# Patient Record
Sex: FEMALE | Race: WHITE | Employment: UNEMPLOYED | URBAN - METROPOLITAN AREA
[De-identification: names, ages, dates, MRNs, and addresses within clinical notes are randomized per-mention and may not be internally consistent; named-entity substitution may affect disease eponyms.]

---
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
## 2019-01-02 NOTE — PROGRESS NOTES
Patient cholesterol levels are high, likely as a medication induced side effect of fluphenazine, we could of course try a different medication to try to manage patient psychosis, but most antipsychotics have a high likelihood of causing these same side eff

## 2019-07-29 ENCOUNTER — LAB ENCOUNTER (OUTPATIENT)
Dept: LAB | Age: 19
End: 2019-07-29
Attending: NURSE PRACTITIONER
Payer: COMMERCIAL

## 2019-07-29 DIAGNOSIS — Z51.81 MEDICATION MONITORING ENCOUNTER: ICD-10-CM

## 2019-07-29 LAB
ALBUMIN SERPL-MCNC: 3.7 G/DL (ref 3.4–5)
ALBUMIN/GLOB SERPL: 0.9 {RATIO} (ref 1–2)
ALP LIVER SERPL-CCNC: 106 U/L (ref 52–144)
ALT SERPL-CCNC: 40 U/L (ref 13–56)
ANION GAP SERPL CALC-SCNC: 7 MMOL/L (ref 0–18)
AST SERPL-CCNC: 19 U/L (ref 15–37)
BASOPHILS # BLD AUTO: 0.1 X10(3) UL (ref 0–0.2)
BASOPHILS NFR BLD AUTO: 1.1 %
BILIRUB SERPL-MCNC: 0.4 MG/DL (ref 0.1–2)
BUN BLD-MCNC: 9 MG/DL (ref 7–18)
BUN/CREAT SERPL: 9.7 (ref 10–20)
CALCIUM BLD-MCNC: 9.5 MG/DL (ref 8.5–10.1)
CHLORIDE SERPL-SCNC: 107 MMOL/L (ref 98–112)
CO2 SERPL-SCNC: 25 MMOL/L (ref 21–32)
CREAT BLD-MCNC: 0.93 MG/DL (ref 0.55–1.02)
DEPRECATED RDW RBC AUTO: 39.3 FL (ref 35.1–46.3)
EOSINOPHIL # BLD AUTO: 0.59 X10(3) UL (ref 0–0.7)
EOSINOPHIL NFR BLD AUTO: 6.6 %
ERYTHROCYTE [DISTWIDTH] IN BLOOD BY AUTOMATED COUNT: 12.1 % (ref 11–15)
FOLATE SERPL-MCNC: 9.2 NG/ML (ref 8.7–?)
GLOBULIN PLAS-MCNC: 4.1 G/DL (ref 2.8–4.4)
GLUCOSE BLD-MCNC: 85 MG/DL (ref 70–99)
HCG SERPL QL: NEGATIVE
HCT VFR BLD AUTO: 45.2 % (ref 35–48)
HGB BLD-MCNC: 15.7 G/DL (ref 12–16)
IMM GRANULOCYTES # BLD AUTO: 0.04 X10(3) UL (ref 0–1)
IMM GRANULOCYTES NFR BLD: 0.4 %
LYMPHOCYTES # BLD AUTO: 2.34 X10(3) UL (ref 1–4)
LYMPHOCYTES NFR BLD AUTO: 26 %
M PROTEIN MFR SERPL ELPH: 7.8 G/DL (ref 6.4–8.2)
MCH RBC QN AUTO: 30.4 PG (ref 26–34)
MCHC RBC AUTO-ENTMCNC: 34.7 G/DL (ref 31–37)
MCV RBC AUTO: 87.6 FL (ref 80–100)
MONOCYTES # BLD AUTO: 0.63 X10(3) UL (ref 0.1–1)
MONOCYTES NFR BLD AUTO: 7 %
NEUTROPHILS # BLD AUTO: 5.3 X10 (3) UL (ref 1.5–7.7)
NEUTROPHILS # BLD AUTO: 5.3 X10(3) UL (ref 1.5–7.7)
NEUTROPHILS NFR BLD AUTO: 58.9 %
OSMOLALITY SERPL CALC.SUM OF ELEC: 286 MOSM/KG (ref 275–295)
PLATELET # BLD AUTO: 225 10(3)UL (ref 150–450)
POTASSIUM SERPL-SCNC: 3.9 MMOL/L (ref 3.5–5.1)
RBC # BLD AUTO: 5.16 X10(6)UL (ref 3.8–5.3)
SODIUM SERPL-SCNC: 139 MMOL/L (ref 136–145)
T4 FREE SERPL-MCNC: 0.9 NG/DL (ref 0.8–1.7)
TSI SER-ACNC: 2.14 MIU/ML (ref 0.36–3.74)
VIT B12 SERPL-MCNC: 525 PG/ML (ref 193–986)
VIT D+METAB SERPL-MCNC: 12 NG/ML (ref 30–100)
WBC # BLD AUTO: 9 X10(3) UL (ref 4–11)

## 2019-07-29 PROCEDURE — 84443 ASSAY THYROID STIM HORMONE: CPT

## 2019-07-29 PROCEDURE — 84439 ASSAY OF FREE THYROXINE: CPT

## 2019-07-29 PROCEDURE — 84703 CHORIONIC GONADOTROPIN ASSAY: CPT

## 2019-07-29 PROCEDURE — 82306 VITAMIN D 25 HYDROXY: CPT

## 2019-07-29 PROCEDURE — 82607 VITAMIN B-12: CPT

## 2019-07-29 PROCEDURE — 82746 ASSAY OF FOLIC ACID SERUM: CPT

## 2019-07-29 PROCEDURE — 80053 COMPREHEN METABOLIC PANEL: CPT

## 2019-07-29 PROCEDURE — 36415 COLL VENOUS BLD VENIPUNCTURE: CPT

## 2019-07-29 PROCEDURE — 85025 COMPLETE CBC W/AUTO DIFF WBC: CPT

## 2019-07-30 NOTE — PROGRESS NOTES
Vitamin D is low; patient should take vitamin D3 otc 3000 iu's daily.  All else within normal limits

## 2019-08-30 PROBLEM — F25.0 SCHIZOAFFECTIVE DISORDER, BIPOLAR TYPE (HCC): Status: ACTIVE | Noted: 2019-08-30

## 2019-08-31 PROBLEM — F43.10 PTSD (POST-TRAUMATIC STRESS DISORDER): Status: ACTIVE | Noted: 2019-08-31

## 2019-08-31 PROBLEM — F84.0 AUTISM SPECTRUM DISORDER: Status: ACTIVE | Noted: 2019-08-31

## 2019-09-16 PROBLEM — F41.9 ANXIETY DISORDER: Status: ACTIVE | Noted: 2018-10-05

## 2019-10-04 ENCOUNTER — LAB ENCOUNTER (OUTPATIENT)
Dept: LAB | Age: 19
End: 2019-10-04
Attending: NURSE PRACTITIONER
Payer: COMMERCIAL

## 2019-10-04 DIAGNOSIS — Z51.81 MEDICATION MONITORING ENCOUNTER: ICD-10-CM

## 2019-10-04 PROCEDURE — 84146 ASSAY OF PROLACTIN: CPT

## 2019-10-04 PROCEDURE — 36415 COLL VENOUS BLD VENIPUNCTURE: CPT

## 2019-10-05 NOTE — PROGRESS NOTES
Prolactin above normal range but significantly lower than previous result 9 months ago. We will continue to monitor as appropriate depending on side effects.

## 2020-01-31 PROBLEM — G47.10 HYPERSOMNIA: Status: ACTIVE | Noted: 2020-01-31

## 2020-08-07 ENCOUNTER — LAB REQUISITION (OUTPATIENT)
Dept: ADMINISTRATIVE | Age: 20
End: 2020-08-07
Payer: COMMERCIAL

## 2020-08-07 DIAGNOSIS — F39 MOOD DISORDER (HCC): ICD-10-CM

## 2020-08-07 PROCEDURE — 84100 ASSAY OF PHOSPHORUS: CPT | Performed by: OTHER

## 2020-08-07 PROCEDURE — 80053 COMPREHEN METABOLIC PANEL: CPT | Performed by: OTHER

## 2020-08-07 PROCEDURE — 80164 ASSAY DIPROPYLACETIC ACD TOT: CPT | Performed by: OTHER

## 2020-08-07 PROCEDURE — 83735 ASSAY OF MAGNESIUM: CPT | Performed by: OTHER

## 2020-08-07 PROCEDURE — 80061 LIPID PANEL: CPT | Performed by: OTHER

## 2020-08-07 PROCEDURE — 36415 COLL VENOUS BLD VENIPUNCTURE: CPT | Performed by: OTHER

## 2020-08-07 PROCEDURE — 82947 ASSAY GLUCOSE BLOOD QUANT: CPT | Performed by: OTHER

## 2020-08-07 PROCEDURE — 82150 ASSAY OF AMYLASE: CPT | Performed by: OTHER

## 2020-08-07 PROCEDURE — 81001 URINALYSIS AUTO W/SCOPE: CPT | Performed by: OTHER

## 2020-08-07 PROCEDURE — 84443 ASSAY THYROID STIM HORMONE: CPT | Performed by: OTHER

## 2020-08-07 PROCEDURE — 80178 ASSAY OF LITHIUM: CPT | Performed by: OTHER

## 2020-08-07 PROCEDURE — 81025 URINE PREGNANCY TEST: CPT | Performed by: OTHER

## 2020-08-08 LAB
B-HCG UR QL: NEGATIVE
BACTERIA UR QL AUTO: NEGATIVE /HPF
BILIRUB UR QL: NEGATIVE
CLARITY UR: CLEAR
COLOR UR: YELLOW
GLUCOSE UR-MCNC: NEGATIVE MG/DL
KETONES UR-MCNC: NEGATIVE MG/DL
LEUKOCYTE ESTERASE UR QL STRIP.AUTO: NEGATIVE
NITRITE UR QL STRIP.AUTO: NEGATIVE
PH UR: 6 [PH] (ref 5–8)
PROT UR-MCNC: NEGATIVE MG/DL
RBC #/AREA URNS AUTO: 58 /HPF
SP GR UR STRIP: 1.01 (ref 1–1.03)
UROBILINOGEN UR STRIP-ACNC: <2
WBC #/AREA URNS AUTO: 2 /HPF

## 2020-08-14 LAB
ALBUMIN SERPL-MCNC: 3.6 G/DL (ref 3.4–5)
ALBUMIN/GLOB SERPL: 1 {RATIO} (ref 1–2)
ALP LIVER SERPL-CCNC: 90 U/L (ref 52–144)
ALT SERPL-CCNC: 22 U/L (ref 13–56)
AMYLASE SERPL-CCNC: 60 U/L (ref 25–115)
ANION GAP SERPL CALC-SCNC: 7 MMOL/L (ref 0–18)
AST SERPL-CCNC: 14 U/L (ref 15–37)
BILIRUB SERPL-MCNC: 0.4 MG/DL (ref 0.1–2)
BUN BLD-MCNC: 13 MG/DL (ref 7–18)
BUN/CREAT SERPL: 12.5 (ref 10–20)
CALCIUM BLD-MCNC: 9.3 MG/DL (ref 8.5–10.1)
CHLORIDE SERPL-SCNC: 107 MMOL/L (ref 98–112)
CHOLEST SMN-MCNC: 174 MG/DL (ref ?–200)
CO2 SERPL-SCNC: 26 MMOL/L (ref 21–32)
CREAT BLD-MCNC: 1.04 MG/DL (ref 0.55–1.02)
GLOBULIN PLAS-MCNC: 3.7 G/DL (ref 2.8–4.4)
GLUCOSE BLD-MCNC: 86 MG/DL (ref 70–99)
GLUCOSE BLD-MCNC: 86 MG/DL (ref 70–99)
HAV IGM SER QL: 2.4 MG/DL (ref 1.6–2.6)
HDLC SERPL-MCNC: 39 MG/DL (ref 40–59)
LDLC SERPL CALC-MCNC: 108 MG/DL (ref ?–100)
LITHIUM SERPL-SCNC: 0.7 MMOL/L (ref 0.6–1.2)
M PROTEIN MFR SERPL ELPH: 7.3 G/DL (ref 6.4–8.2)
NONHDLC SERPL-MCNC: 135 MG/DL (ref ?–130)
OSMOLALITY SERPL CALC.SUM OF ELEC: 289 MOSM/KG (ref 275–295)
PHOSPHATE SERPL-MCNC: 4.9 MG/DL (ref 2.5–4.9)
POTASSIUM SERPL-SCNC: 3.9 MMOL/L (ref 3.5–5.1)
SODIUM SERPL-SCNC: 140 MMOL/L (ref 136–145)
TRIGL SERPL-MCNC: 135 MG/DL (ref 30–149)
TSI SER-ACNC: 6.87 MIU/ML (ref 0.36–3.74)
VALPROATE SERPL-MCNC: <3 UG/ML (ref 50–100)
VLDLC SERPL CALC-MCNC: 27 MG/DL (ref 0–30)

## 2020-08-20 ENCOUNTER — LAB REQUISITION (OUTPATIENT)
Dept: ADMINISTRATIVE | Age: 20
End: 2020-08-20
Payer: COMMERCIAL

## 2020-08-20 DIAGNOSIS — F41.9 ANXIETY DISORDER: ICD-10-CM

## 2020-08-21 PROCEDURE — 86480 TB TEST CELL IMMUN MEASURE: CPT | Performed by: NURSE PRACTITIONER

## 2020-08-21 PROCEDURE — 36415 COLL VENOUS BLD VENIPUNCTURE: CPT | Performed by: NURSE PRACTITIONER

## 2020-08-23 LAB
M TB IFN-G CD4+ T-CELLS BLD-ACNC: 0.02 IU/ML
M TB TUBERC IFN-G BLD QL: NEGATIVE
M TB TUBERC IGNF/MITOGEN IGNF CONTROL: >10 IU/ML
QUANTIFERON TB1 MINUS NIL: 0.02 IU/ML
QUANTIFERON TB2 MINUS NIL: 0 IU/ML